# Patient Record
Sex: MALE | Race: BLACK OR AFRICAN AMERICAN | HISPANIC OR LATINO
[De-identification: names, ages, dates, MRNs, and addresses within clinical notes are randomized per-mention and may not be internally consistent; named-entity substitution may affect disease eponyms.]

---

## 2023-05-10 PROBLEM — Z00.00 ENCOUNTER FOR PREVENTIVE HEALTH EXAMINATION: Status: ACTIVE | Noted: 2023-05-10

## 2023-06-21 ENCOUNTER — APPOINTMENT (OUTPATIENT)
Dept: UROLOGY | Facility: CLINIC | Age: 58
End: 2023-06-21
Payer: COMMERCIAL

## 2023-06-21 ENCOUNTER — NON-APPOINTMENT (OUTPATIENT)
Age: 58
End: 2023-06-21

## 2023-06-21 VITALS
BODY MASS INDEX: 33.32 KG/M2 | HEIGHT: 65 IN | HEART RATE: 77 BPM | WEIGHT: 200 LBS | TEMPERATURE: 96.4 F | DIASTOLIC BLOOD PRESSURE: 90 MMHG | SYSTOLIC BLOOD PRESSURE: 141 MMHG

## 2023-06-21 DIAGNOSIS — Z86.79 PERSONAL HISTORY OF OTHER DISEASES OF THE CIRCULATORY SYSTEM: ICD-10-CM

## 2023-06-21 DIAGNOSIS — Z78.9 OTHER SPECIFIED HEALTH STATUS: ICD-10-CM

## 2023-06-21 DIAGNOSIS — Z80.42 FAMILY HISTORY OF MALIGNANT NEOPLASM OF PROSTATE: ICD-10-CM

## 2023-06-21 DIAGNOSIS — Z80.3 FAMILY HISTORY OF MALIGNANT NEOPLASM OF BREAST: ICD-10-CM

## 2023-06-21 DIAGNOSIS — N52.9 MALE ERECTILE DYSFUNCTION, UNSPECIFIED: ICD-10-CM

## 2023-06-21 DIAGNOSIS — Z82.49 FAMILY HISTORY OF ISCHEMIC HEART DISEASE AND OTHER DISEASES OF THE CIRCULATORY SYSTEM: ICD-10-CM

## 2023-06-21 DIAGNOSIS — Z80.8 FAMILY HISTORY OF MALIGNANT NEOPLASM OF OTHER ORGANS OR SYSTEMS: ICD-10-CM

## 2023-06-21 PROCEDURE — 99203 OFFICE O/P NEW LOW 30 MIN: CPT

## 2023-06-21 RX ORDER — SILDENAFIL 20 MG/1
20 TABLET ORAL
Qty: 15 | Refills: 0 | Status: ACTIVE | COMMUNITY
Start: 2023-06-21 | End: 1900-01-01

## 2023-06-21 NOTE — HISTORY OF PRESENT ILLNESS
[FreeTextEntry1] : 56yo M recently diagnosed with HTN presents to discuss ED\par He reports last 2 intercourse, he wasn't able to have good erection\par He reports he did have good erection in between \par Admits to anxiety, stress\par \par No urinary complaints

## 2023-06-21 NOTE — ASSESSMENT
[FreeTextEntry1] : Erectile dysfunction:\par likely psychogenic\par will start low dose sildenafil 20mg prn\par f/u 3 months\par PSA to be done with pcp

## 2023-06-21 NOTE — LETTER BODY
[Dear  ___] : Dear ~MAHI, [Consult Letter:] : I had the pleasure of evaluating your patient, [unfilled]. [Please see my note below.] : Please see my note below. [Consult Closing:] : Thank you very much for allowing me to participate in the care of this patient.  If you have any questions, please do not hesitate to contact me. [Sincerely,] : Sincerely, [FreeTextEntry3] : Naida Moore, \par Genitourinary Medicine\par [facility]\par

## 2023-09-13 ENCOUNTER — APPOINTMENT (OUTPATIENT)
Dept: UROLOGY | Facility: CLINIC | Age: 58
End: 2023-09-13
Payer: COMMERCIAL

## 2023-09-13 VITALS
SYSTOLIC BLOOD PRESSURE: 123 MMHG | HEIGHT: 65 IN | DIASTOLIC BLOOD PRESSURE: 78 MMHG | TEMPERATURE: 97.2 F | WEIGHT: 200 LBS | HEART RATE: 76 BPM | BODY MASS INDEX: 33.32 KG/M2

## 2023-09-13 DIAGNOSIS — R97.20 ELEVATED PROSTATE, SPECIFIC ANTIGEN [PSA]: ICD-10-CM

## 2023-09-13 PROCEDURE — 99213 OFFICE O/P EST LOW 20 MIN: CPT

## 2023-09-13 RX ORDER — AMLODIPINE BESYLATE 10 MG/1
10 TABLET ORAL
Refills: 0 | Status: ACTIVE | COMMUNITY

## 2023-09-13 RX ORDER — AMLODIPINE BESYLATE 5 MG/1
5 TABLET ORAL
Refills: 0 | Status: COMPLETED | COMMUNITY
End: 2023-09-13

## 2023-10-04 ENCOUNTER — APPOINTMENT (OUTPATIENT)
Dept: UROLOGY | Facility: CLINIC | Age: 58
End: 2023-10-04

## 2023-10-27 LAB
PSA FREE FLD-MCNC: 29 %
PSA FREE SERPL-MCNC: 1.04 NG/ML
PSA SERPL-MCNC: 3.57 NG/ML

## 2025-09-19 ENCOUNTER — APPOINTMENT (OUTPATIENT)
Dept: CARDIOLOGY | Facility: CLINIC | Age: 60
End: 2025-09-19